# Patient Record
Sex: FEMALE | Race: WHITE | NOT HISPANIC OR LATINO | Employment: UNEMPLOYED | URBAN - METROPOLITAN AREA
[De-identification: names, ages, dates, MRNs, and addresses within clinical notes are randomized per-mention and may not be internally consistent; named-entity substitution may affect disease eponyms.]

---

## 2017-02-27 ENCOUNTER — NURSE TRIAGE (OUTPATIENT)
Dept: ADMINISTRATIVE | Facility: CLINIC | Age: 27
End: 2017-02-27

## 2017-02-27 ENCOUNTER — PATIENT MESSAGE (OUTPATIENT)
Dept: ENDOCRINOLOGY | Facility: CLINIC | Age: 27
End: 2017-02-27

## 2017-02-27 DIAGNOSIS — E10.9 TYPE 1 DIABETES MELLITUS WITHOUT COMPLICATION: ICD-10-CM

## 2017-02-28 NOTE — TELEPHONE ENCOUNTER
"  Reason for Disposition   [1] Request for URGENT new prescription or refill of "essential" medication (i.e., likelihood of harm to patient if not taken) AND [2] triager unable to fill per unit policy    Answer Assessment - Initial Assessment Questions  Pt requesting refill on her humalog insulin as she broke a vial. Type 1 diabetic.    Protocols used: ST MEDICATION QUESTION CALL-A-    Contacted Dr Moore who authorized refill on humalog. When I called the pharmacy they advised she did in fact have refills. Pt was advised. She had been told she had no refills.  "

## 2017-03-01 DIAGNOSIS — E10.9 TYPE 1 DIABETES MELLITUS WITHOUT COMPLICATION: ICD-10-CM

## 2017-03-01 RX ORDER — INSULIN LISPRO 100 [IU]/ML
INJECTION, SOLUTION INTRAVENOUS; SUBCUTANEOUS
Qty: 5 VIAL | Refills: 6 | Status: SHIPPED | OUTPATIENT
Start: 2017-03-01 | End: 2017-06-28 | Stop reason: CLARIF

## 2017-03-01 RX ORDER — INSULIN LISPRO 100 [IU]/ML
INJECTION, SOLUTION INTRAVENOUS; SUBCUTANEOUS
Qty: 5 VIAL | Refills: 6 | Status: SHIPPED | OUTPATIENT
Start: 2017-03-01 | End: 2017-03-01 | Stop reason: SDUPTHER

## 2017-03-01 NOTE — TELEPHONE ENCOUNTER
----- Message from Mis Zapata sent at 2/27/2017  4:55 PM CST -----  Contact: Patient  Patient called requesting Rx refill (humalog insulin) send to [facility] on [address] [facility]. Please call back to advise when sent at [phone]. Thanks,

## 2017-06-13 ENCOUNTER — TELEPHONE (OUTPATIENT)
Dept: ENDOCRINOLOGY | Facility: CLINIC | Age: 27
End: 2017-06-13

## 2017-06-13 NOTE — TELEPHONE ENCOUNTER
Called pt concerning her 10:30am appt today b/c she has not shown up for it & she confirmed her appt w/ our dept yesterday when we called her to confirm; pt states she called in & advised the phone room that she woke up late & could not make her appt today & rescheduled for Thursday 6/13; however, pt did not call in until 10:44am today to reschedule her appt so it has to be considered a no-show b/c she did not call until after her appt time had already passed, not leaving us the option to offer that appt time to someone else; advised pt we will see her at her appt on Thursday.

## 2017-06-15 ENCOUNTER — OFFICE VISIT (OUTPATIENT)
Dept: ENDOCRINOLOGY | Facility: CLINIC | Age: 27
End: 2017-06-15
Payer: MEDICAID

## 2017-06-15 VITALS
HEART RATE: 110 BPM | BODY MASS INDEX: 26.02 KG/M2 | SYSTOLIC BLOOD PRESSURE: 122 MMHG | RESPIRATION RATE: 18 BRPM | WEIGHT: 154 LBS | DIASTOLIC BLOOD PRESSURE: 86 MMHG

## 2017-06-15 DIAGNOSIS — Z46.81 FITTING AND ADJUSTMENT OF INSULIN PUMP: ICD-10-CM

## 2017-06-15 DIAGNOSIS — Z96.41 INSULIN PUMP STATUS: ICD-10-CM

## 2017-06-15 DIAGNOSIS — G63 POLYNEUROPATHY ASSOCIATED WITH UNDERLYING DISEASE: ICD-10-CM

## 2017-06-15 DIAGNOSIS — Z79.4 INSULIN LONG-TERM USE: ICD-10-CM

## 2017-06-15 DIAGNOSIS — E10.42 TYPE 1 DIABETES MELLITUS WITH DIABETIC POLYNEUROPATHY: Primary | ICD-10-CM

## 2017-06-15 PROCEDURE — 99215 OFFICE O/P EST HI 40 MIN: CPT | Mod: S$PBB,,, | Performed by: NURSE PRACTITIONER

## 2017-06-15 PROCEDURE — 99213 OFFICE O/P EST LOW 20 MIN: CPT | Mod: PBBFAC,PO | Performed by: NURSE PRACTITIONER

## 2017-06-15 PROCEDURE — 3046F HEMOGLOBIN A1C LEVEL >9.0%: CPT | Mod: ,,, | Performed by: NURSE PRACTITIONER

## 2017-06-15 PROCEDURE — 99999 PR PBB SHADOW E&M-EST. PATIENT-LVL III: CPT | Mod: PBBFAC,,, | Performed by: NURSE PRACTITIONER

## 2017-06-15 RX ORDER — INSULIN DEGLUDEC 200 U/ML
50 INJECTION, SOLUTION SUBCUTANEOUS DAILY
Qty: 18 ML | Refills: 12 | Status: SHIPPED | OUTPATIENT
Start: 2017-06-15 | End: 2017-06-15 | Stop reason: SDUPTHER

## 2017-06-15 RX ORDER — INSULIN DEGLUDEC 200 U/ML
50 INJECTION, SOLUTION SUBCUTANEOUS DAILY
Qty: 3 ML | Refills: 12 | COMMUNITY
Start: 2017-06-15 | End: 2017-06-28 | Stop reason: CLARIF

## 2017-06-15 RX ORDER — INSULIN LISPRO 100 [IU]/ML
15 INJECTION, SOLUTION INTRAVENOUS; SUBCUTANEOUS
Qty: 13.5 ML | Refills: 11 | Status: SHIPPED | OUTPATIENT
Start: 2017-06-15 | End: 2017-06-15 | Stop reason: SDUPTHER

## 2017-06-15 RX ORDER — INSULIN LISPRO 100 [IU]/ML
15 INJECTION, SOLUTION INTRAVENOUS; SUBCUTANEOUS
Qty: 3 ML | Refills: 11 | COMMUNITY
Start: 2017-06-15 | End: 2017-06-28 | Stop reason: CLARIF

## 2017-06-15 RX ORDER — FLUVOXAMINE MALEATE 50 MG/1
50 TABLET ORAL DAILY
Refills: 1 | COMMUNITY
Start: 2017-03-13

## 2017-06-15 NOTE — PROGRESS NOTES
Subjective:       Patient ID: Brandie Riggs is a 26 y.o. female.    Chief Complaint:  Routine Type 1 DM f/u     HPI  Pt is a 25 y/o wf with a dx of Type 1 Dm since teens and only chronic complication of peripheral neuropathy. Historically very uncontrolled r/t elise noncompliance issues  With bolusing for meals.  Pt not seen in near a year--again. Stopped smoking so crankier and eating more.  2 Deaths in family--paternal GF--was close--and Uncle.  Issues withi insulin pump last several days--keeps saying motor failure.  Smoking a good bit of marijuana-prebreakfast, lunch, supper and bt.  Basically when she should be checking glucoses.  States helps with headaches and nausea.      Interim Events:   Not seen since November. Wants to get off the pump.  States having a lot of issues with scar tissue and as such having absorption issues.  Pt not entering info in pump.  Veronika reviewed and grossly, FBS are usually 200-300,  And supper a lot of 300-400's-however I am not sure if she is even bolusing regular thru the pump.  She as increased basal settins up quite a bit.   Pt has had issues with nocturnal hypoglycemia in past--which is why overnight basal markedly decreased.       Medtronic pump  Basals  12 mid  0.8 u/hr u/hr  6 am----1.5 u/hr  6 pm ---2 u/hr  10 pm --2 u/hr    CHO 1:5  6 pm --1.8   ISF 1: 50   Goal 100-140  IOB 4 hrs     Review of Systems   Constitutional: Negative for activity change and fatigue.   HENT: Negative for hearing loss and trouble swallowing.    Eyes: Positive for visual disturbance. Negative for photophobia.        Last Eye Exam   Respiratory: Negative for cough and shortness of breath.    Cardiovascular: Negative for chest pain and palpitations.   Gastrointestinal: Negative for constipation and diarrhea.   Genitourinary: Negative for frequency and urgency.   Musculoskeletal: Negative for arthralgias and myalgias.   Skin: Negative for rash and wound.   Neurological: Positive for numbness (feet,  legs feel like jelly sometimes, but not hypoglycemia, issues with balance with near falls. ). Negative for weakness.   Psychiatric/Behavioral: Negative for sleep disturbance. The patient is not nervous/anxious.        Objective:      Physical Exam   Constitutional: She is oriented to person, place, and time. She appears well-developed and well-nourished.   Vital Signs  Pulse: 110  Resp: 18  BP: 122/86  Pain Score: 0-No pain  Height and Weight  Weight: 69.8 kg (153 lb 15.9 oz)]     HENT:   Head: Normocephalic and atraumatic.   Nose: Nose normal.   Mouth/Throat: Oropharynx is clear and moist.   Eyes: Conjunctivae and EOM are normal. Pupils are equal, round, and reactive to light.   Neck: Normal range of motion. Neck supple. No tracheal deviation present.   Cardiovascular: Normal rate, regular rhythm, normal heart sounds and intact distal pulses.    Pulmonary/Chest: Effort normal and breath sounds normal.   Musculoskeletal: Normal range of motion.   Feet:  No open wounds or calluses.  Good pedal care.    Pedal pulses + 2 bilaterally   Vibratory sensation to feet slightly decreased   Lymphadenopathy:     She has no cervical adenopathy.   Neurological: She is alert and oriented to person, place, and time.   Vibratory sensation to feet:     Skin: Skin is warm and dry.   Psychiatric: She has a normal mood and affect. Her behavior is normal. Thought content normal.   Vitals reviewed.        Hemoglobin A1C   Date Value Ref Range Status   09/09/2016 11.9 (H) 4.5 - 6.2 % Final     Comment:     According to ADA guidelines, hemoglobin A1C <7.0% represents  optimal control in non-pregnant diabetic patients.  Different  metrics may apply to specific populations.   Standards of Medical Care in Diabetes - 2016.  For the purpose of screening for the presence of diabetes:  <5.7%     Consistent with the absence of diabetes  5.7-6.4%  Consistent with increasing risk for diabetes   (prediabetes)  >or=6.5%  Consistent with  diabetes  Currently no consensus exists for use of hemoglobin A1C  for diagnosis of diabetes for children.     09/07/2016 11.9 (H) 4.5 - 6.2 % Final     Comment:     According to ADA guidelines, hemoglobin A1C <7.0% represents  optimal control in non-pregnant diabetic patients.  Different  metrics may apply to specific populations.   Standards of Medical Care in Diabetes - 2016.  For the purpose of screening for the presence of diabetes:  <5.7%     Consistent with the absence of diabetes  5.7-6.4%  Consistent with increasing risk for diabetes   (prediabetes)  >or=6.5%  Consistent with diabetes  Currently no consensus exists for use of hemoglobin A1C  for diagnosis of diabetes for children.     09/29/2015 10.9 (H) 4.5 - 6.2 % Final       Chemistry        Component Value Date/Time     04/16/2017 1258    K 4.1 04/16/2017 1258    CL 99 04/16/2017 1258    CO2 22 04/16/2017 1258    BUN 9 04/16/2017 1258    CREATININE 0.74 04/16/2017 1258     (H) 04/16/2017 1258        Component Value Date/Time    CALCIUM 9.2 04/16/2017 1258    ALKPHOS 75 04/16/2017 1258    AST 22 04/16/2017 1258    ALT 22 04/16/2017 1258    BILITOT 0.5 04/16/2017 1258        Lab Results   Component Value Date    LDLCALC 165.6 (H) 09/09/2016     Lab Results   Component Value Date    TSH 0.995 09/29/2015       Assessment:       1. Type 1 diabetes mellitus with diabetic polyneuropathy  Hemoglobin A1c  Chronic-uncontrolled-see plan     Microalbumin/creatinine urine ratio    Hemoglobin A1c    Lipid panel    Microalbumin/creatinine urine ratio    insulin lispro (HUMALOG KWIKPEN) 100 unit/mL InPn pen    insulin degludec 200 unit/mL (3 mL) InPn    DISCONTINUED: insulin degludec 200 unit/mL (3 mL) InPn    DISCONTINUED: insulin lispro (HUMALOG KWIKPEN) 100 unit/mL InPn pen   2. Polyneuropathy associated with underlying disease  -chronic-worsened-monitor --leg weakness my be r/t neuropathy or increased cannabis. use   3. Insulin pump status     4.  Insulin long-term use     5. Fitting and adjustment of insulin pump         Plan:       D/c insulin pump  Start with 35 of Tresiba-morning--and it has better outcomes r./t nocturnal hypoglycemia--can titrate up by 2 units every 5 days until FBS are < 140.  Expect pt to need 45 to 50 units    Humalog CHO 1:5 plus ss  (ISF 1:25)---pt needs 10-15 units with meals.     ORDERS  6/15/17     2 mo with me fasting lipids, a1c, urine m/c       Complex-45 in >50% on new insulin regimen.

## 2017-06-16 ENCOUNTER — PATIENT MESSAGE (OUTPATIENT)
Dept: ENDOCRINOLOGY | Facility: CLINIC | Age: 27
End: 2017-06-16

## 2017-06-20 NOTE — TELEPHONE ENCOUNTER
----- Message from Anamaria Hernandez sent at 6/20/2017  1:03 PM CDT -----  Contact: [facility]  Pharmacy called regarding the patient's medication, insulin lispro (HUMALOG) 100 unit/mL injection. Checking on prior authorization. Please contact [place]      [facility]/pharmacy #[id number] - [place] - [address] AT [facility]  [address] [place] [address]  Phone: [phone] Fax: [phone]

## 2017-06-21 ENCOUNTER — TELEPHONE (OUTPATIENT)
Dept: ENDOCRINOLOGY | Facility: CLINIC | Age: 27
End: 2017-06-21

## 2017-06-22 ENCOUNTER — PATIENT MESSAGE (OUTPATIENT)
Dept: ENDOCRINOLOGY | Facility: CLINIC | Age: 27
End: 2017-06-22

## 2017-06-23 NOTE — TELEPHONE ENCOUNTER
Melania:  Please see pt's email about info to complete her form you have from [employer].

## 2017-06-28 RX ORDER — INSULIN ASPART 100 [IU]/ML
INJECTION, SOLUTION INTRAVENOUS; SUBCUTANEOUS
Qty: 1 BOX | Refills: 12 | Status: SHIPPED | OUTPATIENT
Start: 2017-06-28 | End: 2017-06-28

## 2017-06-28 RX ORDER — PEN NEEDLE, DIABETIC 30 GX3/16"
1 NEEDLE, DISPOSABLE MISCELLANEOUS
Qty: 150 EACH | Refills: 12 | Status: SHIPPED | OUTPATIENT
Start: 2017-06-28

## 2017-06-28 RX ORDER — INSULIN ASPART 100 [IU]/ML
15 INJECTION, SOLUTION INTRAVENOUS; SUBCUTANEOUS
Qty: 15 ML | Refills: 6 | Status: SHIPPED | OUTPATIENT
Start: 2017-06-28 | End: 2017-06-30 | Stop reason: CLARIF

## 2017-06-28 NOTE — TELEPHONE ENCOUNTER
----- Message from Renata Giordano sent at 6/28/2017 12:35 PM CDT -----  Contact: [facility]   [phone]  Pharmacy called regarding PA for three rx  apidra solar star,rx Humalog quick pen, rx tresiba  for above patient.  Thanks!

## 2017-06-28 NOTE — TELEPHONE ENCOUNTER
"I spoke to Alex, the pharmacist, and told him we would send in all new rx"s for Ms. Riggs. We are putting her on Novolog Flex Pens and Levemir Pens, b/c her insurance will cover those insulins.   "

## 2017-06-29 NOTE — TELEPHONE ENCOUNTER
Melania: care to address please; we know that Hilda had some forms on Brandie this AM but not sure what the forms were for.

## 2017-06-30 RX ORDER — PEN NEEDLE, DIABETIC 29 G X1/2"
1 NEEDLE, DISPOSABLE MISCELLANEOUS 4 TIMES DAILY
Qty: 150 EACH | Refills: 12 | Status: SHIPPED | OUTPATIENT
Start: 2017-06-30 | End: 2017-09-18 | Stop reason: SDUPTHER

## 2017-06-30 RX ORDER — INSULIN GLARGINE 300 [IU]/ML
50 INJECTION, SOLUTION SUBCUTANEOUS NIGHTLY
Qty: 5 ML | Refills: 12 | Status: SHIPPED | OUTPATIENT
Start: 2017-06-30 | End: 2017-07-30

## 2017-06-30 RX ORDER — INSULIN LISPRO 100 [IU]/ML
15 INJECTION, SOLUTION INTRAVENOUS; SUBCUTANEOUS
Qty: 1 VIAL | Refills: 12 | Status: SHIPPED | OUTPATIENT
Start: 2017-06-30 | End: 2017-09-18 | Stop reason: SDUPTHER

## 2017-07-12 ENCOUNTER — LAB VISIT (OUTPATIENT)
Dept: LAB | Facility: HOSPITAL | Age: 27
End: 2017-07-12
Attending: NURSE PRACTITIONER
Payer: MEDICAID

## 2017-07-12 DIAGNOSIS — E10.42 TYPE 1 DIABETES MELLITUS WITH DIABETIC POLYNEUROPATHY: ICD-10-CM

## 2017-07-12 LAB
CHOLEST/HDLC SERPL: 4.1 {RATIO}
CREAT UR-MCNC: 102 MG/DL
HDL/CHOLESTEROL RATIO: 24.4 %
HDLC SERPL-MCNC: 197 MG/DL
HDLC SERPL-MCNC: 48 MG/DL
LDLC SERPL CALC-MCNC: 130.2 MG/DL
MICROALBUMIN UR DL<=1MG/L-MCNC: 57 UG/ML
MICROALBUMIN/CREATININE RATIO: 55.9 UG/MG
NONHDLC SERPL-MCNC: 149 MG/DL
TRIGL SERPL-MCNC: 94 MG/DL

## 2017-07-12 PROCEDURE — 80061 LIPID PANEL: CPT

## 2017-07-12 PROCEDURE — 36415 COLL VENOUS BLD VENIPUNCTURE: CPT | Mod: PO

## 2017-07-12 PROCEDURE — 83036 HEMOGLOBIN GLYCOSYLATED A1C: CPT

## 2017-07-12 PROCEDURE — 82570 ASSAY OF URINE CREATININE: CPT

## 2017-07-13 ENCOUNTER — OFFICE VISIT (OUTPATIENT)
Dept: ENDOCRINOLOGY | Facility: CLINIC | Age: 27
End: 2017-07-13
Payer: MEDICAID

## 2017-07-13 VITALS
SYSTOLIC BLOOD PRESSURE: 122 MMHG | WEIGHT: 157.88 LBS | HEIGHT: 65 IN | HEART RATE: 96 BPM | DIASTOLIC BLOOD PRESSURE: 86 MMHG | BODY MASS INDEX: 26.3 KG/M2

## 2017-07-13 DIAGNOSIS — E11.649 HYPOGLYCEMIA ASSOCIATED WITH DIABETES: ICD-10-CM

## 2017-07-13 DIAGNOSIS — E10.42 TYPE 1 DIABETES MELLITUS WITH DIABETIC POLYNEUROPATHY: Primary | ICD-10-CM

## 2017-07-13 DIAGNOSIS — Z51.81 MEDICATION MONITORING ENCOUNTER: ICD-10-CM

## 2017-07-13 LAB
ESTIMATED AVG GLUCOSE: 237 MG/DL
HBA1C MFR BLD HPLC: 9.9 %

## 2017-07-13 PROCEDURE — 99999 PR PBB SHADOW E&M-EST. PATIENT-LVL III: CPT | Mod: PBBFAC,,, | Performed by: NURSE PRACTITIONER

## 2017-07-13 PROCEDURE — 99213 OFFICE O/P EST LOW 20 MIN: CPT | Mod: S$PBB,,, | Performed by: NURSE PRACTITIONER

## 2017-07-13 PROCEDURE — 3046F HEMOGLOBIN A1C LEVEL >9.0%: CPT | Mod: ,,, | Performed by: NURSE PRACTITIONER

## 2017-07-13 PROCEDURE — 99213 OFFICE O/P EST LOW 20 MIN: CPT | Mod: PBBFAC,PO | Performed by: NURSE PRACTITIONER

## 2017-07-13 RX ORDER — PEN NEEDLE, DIABETIC 30 GX3/16"
NEEDLE, DISPOSABLE MISCELLANEOUS
Qty: 200 EACH | Refills: 12 | Status: SHIPPED | OUTPATIENT
Start: 2017-07-13 | End: 2018-06-01 | Stop reason: SDUPTHER

## 2017-07-13 NOTE — PROGRESS NOTES
Subjective:       Patient ID: Brandie Ramirez is a 26 y.o. female.    Chief Complaint:  Routine Type 1 DM f/u --glucose log review.     HPI  Pt is a 25 y/o wf with a dx of Type 1 Dm since teens and only chronic complication of peripheral neuropathy. Historically very uncontrolled r/t elise noncompliance issues  With bolusing for meals.  Pt not seen in near a year--again. Stopped smoking so crankier and eating more.  2 Deaths in family--paternal GF--was close--and Uncle.  Issues withi insulin pump last several days--keeps saying motor failure.  Smoking a good bit of marijuana-prebreakfast, lunch, supper and bt.  Basically when she should be checking glucoses.  States helps with headaches and nausea.      Interim Events:  Pt on pump vacations r/t c/o  ssues with scar tissue and as such having absorption issues.  Pt not entering info in pump.    Brings logs.  Overall looks much better because she is actually getting prandial insulin.  Was taking Toujeo in the moring but was complaining of evening hypoglycemia--so she moved it to bedtime.  Hypoglycemia grossly improved.  BT readings still way to low.      Toujeo 35 qhs,   Novlog CHO 1:5, SS ISF 1:25.             Prior Settings.   Medtronic pump  Basals  12 mid  0.8 u/hr u/hr  6 am----1.5 u/hr  6 pm ---2 u/hr  10 pm --2 u/hr    CHO 1:5  6 pm --1.8   ISF 1: 50   Goal 100-140  IOB 4 hrs     Review of Systems    Objective:      Physical Exam   Constitutional: She is oriented to person, place, and time. She appears well-developed and well-nourished.   Vital Signs  Pulse: 110  Resp: 18  BP: 122/86  Pain Score: 0-No pain  Height and Weight  Weight: 69.8 kg (153 lb 15.9 oz)]     HENT:   Head: Normocephalic and atraumatic.   Nose: Nose normal.   Mouth/Throat: Oropharynx is clear and moist.   Eyes: Conjunctivae and EOM are normal. Pupils are equal, round, and reactive to light.   Neck: Normal range of motion. Neck supple. No tracheal deviation present.   Cardiovascular: Normal  rate, regular rhythm, normal heart sounds and intact distal pulses.    Pulmonary/Chest: Effort normal and breath sounds normal.   Musculoskeletal: Normal range of motion.   Feet:  No open wounds or calluses.  Good pedal care.    Pedal pulses + 2 bilaterally   Vibratory sensation to feet slightly decreased   Lymphadenopathy:     She has no cervical adenopathy.   Neurological: She is alert and oriented to person, place, and time.   Vibratory sensation to feet:     Skin: Skin is warm and dry.   Psychiatric: She has a normal mood and affect. Her behavior is normal. Thought content normal.   Vitals reviewed.        Hemoglobin A1C   Date Value Ref Range Status   07/12/2017 9.9 (H) 4.0 - 5.6 % Final     Comment:     According to ADA guidelines, hemoglobin A1c <7.0% represents  optimal control in non-pregnant diabetic patients. Different  metrics may apply to specific patient populations.   Standards of Medical Care in Diabetes-2016.  For the purpose of screening for the presence of diabetes:  <5.7%     Consistent with the absence of diabetes  5.7-6.4%  Consistent with increasing risk for diabetes   (prediabetes)  >or=6.5%  Consistent with diabetes  Currently, no consensus exists for use of hemoglobin A1c  for diagnosis of diabetes for children.  This Hemoglobin A1c assay has significant interference with fetal   hemoglobin   (HbF). The results are invalid for patients with abnormal amounts of   HbF,   including those with known Hereditary Persistence   of Fetal Hemoglobin. Heterozygous hemoglobin variants (HbAS, HbAC,   HbAD, HbAE, HbA2) do not significantly interfere with this assay;   however, presence of multiple variants in a sample may impact the %   interference.     09/09/2016 11.9 (H) 4.5 - 6.2 % Final     Comment:     According to ADA guidelines, hemoglobin A1C <7.0% represents  optimal control in non-pregnant diabetic patients.  Different  metrics may apply to specific populations.   Standards of Medical Care in  Diabetes - 2016.  For the purpose of screening for the presence of diabetes:  <5.7%     Consistent with the absence of diabetes  5.7-6.4%  Consistent with increasing risk for diabetes   (prediabetes)  >or=6.5%  Consistent with diabetes  Currently no consensus exists for use of hemoglobin A1C  for diagnosis of diabetes for children.     09/07/2016 11.9 (H) 4.5 - 6.2 % Final     Comment:     According to ADA guidelines, hemoglobin A1C <7.0% represents  optimal control in non-pregnant diabetic patients.  Different  metrics may apply to specific populations.   Standards of Medical Care in Diabetes - 2016.  For the purpose of screening for the presence of diabetes:  <5.7%     Consistent with the absence of diabetes  5.7-6.4%  Consistent with increasing risk for diabetes   (prediabetes)  >or=6.5%  Consistent with diabetes  Currently no consensus exists for use of hemoglobin A1C  for diagnosis of diabetes for children.         Chemistry        Component Value Date/Time     04/16/2017 1258    K 4.1 04/16/2017 1258    CL 99 04/16/2017 1258    CO2 22 04/16/2017 1258    BUN 9 04/16/2017 1258    CREATININE 0.74 04/16/2017 1258     (H) 04/16/2017 1258        Component Value Date/Time    CALCIUM 9.2 04/16/2017 1258    ALKPHOS 75 04/16/2017 1258    AST 22 04/16/2017 1258    ALT 22 04/16/2017 1258    BILITOT 0.5 04/16/2017 1258        Lab Results   Component Value Date    LDLCALC 130.2 07/12/2017     Lab Results   Component Value Date    TSH 0.995 09/29/2015       Assessment:       1. Type 1 diabetes mellitus with diabetic polyneuropathy  Hemoglobin A1c  Chronic-uncontrolled-see play    2. Hypoglycemia associated with diabetes  Acute-high risk-see plan    3. Medication monitoring encounter             Plan:       Continue 35 of Toujeo qhs,   Humalog CHO 1:5 plus ss  (ISF 1:25)---pt needs 10-15 units with meals.   Need to decrease the aggressiveness of supper dose to 1:7       ORDERS  7/14/17     2 mo with me a1c prior      Complex-45 in >50% on new insulin regimen.

## 2017-08-27 ENCOUNTER — PATIENT MESSAGE (OUTPATIENT)
Dept: ENDOCRINOLOGY | Facility: CLINIC | Age: 27
End: 2017-08-27

## 2017-08-28 RX ORDER — INSULIN GLARGINE 300 [IU]/ML
35 INJECTION, SOLUTION SUBCUTANEOUS NIGHTLY
Qty: 4.5 ML | Refills: 3 | Status: SHIPPED | OUTPATIENT
Start: 2017-08-28 | End: 2017-09-18 | Stop reason: SDUPTHER

## 2017-09-19 RX ORDER — INSULIN LISPRO 100 [IU]/ML
15 INJECTION, SOLUTION INTRAVENOUS; SUBCUTANEOUS
Qty: 1 VIAL | Refills: 12 | Status: SHIPPED | OUTPATIENT
Start: 2017-09-19 | End: 2018-05-23 | Stop reason: SDUPTHER

## 2017-09-19 RX ORDER — INSULIN GLARGINE 300 [IU]/ML
35 INJECTION, SOLUTION SUBCUTANEOUS NIGHTLY
Qty: 4.5 ML | Refills: 3 | Status: SHIPPED | OUTPATIENT
Start: 2017-09-19 | End: 2017-11-16 | Stop reason: SDUPTHER

## 2017-09-19 RX ORDER — PEN NEEDLE, DIABETIC 29 G X1/2"
1 NEEDLE, DISPOSABLE MISCELLANEOUS 4 TIMES DAILY
Qty: 150 EACH | Refills: 12 | Status: SHIPPED | OUTPATIENT
Start: 2017-09-19 | End: 2017-11-16 | Stop reason: SDUPTHER

## 2017-11-16 ENCOUNTER — PATIENT MESSAGE (OUTPATIENT)
Dept: ENDOCRINOLOGY | Facility: CLINIC | Age: 27
End: 2017-11-16

## 2017-11-16 RX ORDER — PEN NEEDLE, DIABETIC 29 G X1/2"
1 NEEDLE, DISPOSABLE MISCELLANEOUS
Qty: 250 EACH | Refills: 6 | Status: SHIPPED | OUTPATIENT
Start: 2017-11-16 | End: 2018-03-13 | Stop reason: SDUPTHER

## 2017-11-16 RX ORDER — INSULIN GLARGINE 300 [IU]/ML
35 INJECTION, SOLUTION SUBCUTANEOUS NIGHTLY
Qty: 4.5 ML | Refills: 3 | Status: SHIPPED | OUTPATIENT
Start: 2017-11-16 | End: 2018-03-13 | Stop reason: SDUPTHER

## 2018-03-13 RX ORDER — INSULIN GLARGINE 300 [IU]/ML
35 INJECTION, SOLUTION SUBCUTANEOUS NIGHTLY
Qty: 4.5 ML | Refills: 3 | Status: SHIPPED | OUTPATIENT
Start: 2018-03-13 | End: 2018-04-17 | Stop reason: SDUPTHER

## 2018-03-13 RX ORDER — PEN NEEDLE, DIABETIC 29 G X1/2"
1 NEEDLE, DISPOSABLE MISCELLANEOUS
Qty: 250 EACH | Refills: 6 | Status: SHIPPED | OUTPATIENT
Start: 2018-03-13 | End: 2018-03-21 | Stop reason: SDUPTHER

## 2018-03-21 RX ORDER — PEN NEEDLE, DIABETIC 29 G X1/2"
1 NEEDLE, DISPOSABLE MISCELLANEOUS 3 TIMES DAILY
Qty: 250 EACH | Refills: 6 | Status: SHIPPED | OUTPATIENT
Start: 2018-03-21 | End: 2018-05-23 | Stop reason: SDUPTHER

## 2018-04-17 RX ORDER — INSULIN GLARGINE 300 [IU]/ML
35 INJECTION, SOLUTION SUBCUTANEOUS NIGHTLY
Qty: 4.5 ML | Refills: 3 | Status: SHIPPED | OUTPATIENT
Start: 2018-04-17 | End: 2018-05-23 | Stop reason: SDUPTHER

## 2018-06-01 RX ORDER — INSULIN GLARGINE 300 [IU]/ML
35 INJECTION, SOLUTION SUBCUTANEOUS NIGHTLY
Qty: 4.5 ML | Refills: 3 | Status: SHIPPED | OUTPATIENT
Start: 2018-06-01

## 2018-06-01 RX ORDER — PEN NEEDLE, DIABETIC 29 G X1/2"
1 NEEDLE, DISPOSABLE MISCELLANEOUS 3 TIMES DAILY
Qty: 250 EACH | Refills: 6 | Status: SHIPPED | OUTPATIENT
Start: 2018-06-01

## 2018-06-01 RX ORDER — PEN NEEDLE, DIABETIC 30 GX3/16"
NEEDLE, DISPOSABLE MISCELLANEOUS
Qty: 200 EACH | Refills: 0 | Status: SHIPPED | OUTPATIENT
Start: 2018-06-01

## 2018-06-01 RX ORDER — INSULIN LISPRO 100 [IU]/ML
15 INJECTION, SOLUTION INTRAVENOUS; SUBCUTANEOUS
Qty: 1 VIAL | Refills: 12 | Status: SHIPPED | OUTPATIENT
Start: 2018-06-01